# Patient Record
Sex: MALE | Race: WHITE | NOT HISPANIC OR LATINO | Employment: OTHER | URBAN - METROPOLITAN AREA
[De-identification: names, ages, dates, MRNs, and addresses within clinical notes are randomized per-mention and may not be internally consistent; named-entity substitution may affect disease eponyms.]

---

## 2021-01-05 ENCOUNTER — HOSPITAL ENCOUNTER (OUTPATIENT)
Dept: GASTROENTEROLOGY | Facility: HOSPITAL | Age: 66
Setting detail: HOSPITAL OUTPATIENT SURGERY
Discharge: HOME | End: 2021-01-05
Attending: INTERNAL MEDICINE

## 2021-05-22 ENCOUNTER — TRANSCRIBE ORDERS (OUTPATIENT)
Dept: LAB | Facility: HOSPITAL | Age: 66
End: 2021-05-22

## 2021-05-22 DIAGNOSIS — Z01.818 PRE-OP TESTING: Primary | ICD-10-CM

## 2021-08-02 ENCOUNTER — APPOINTMENT (OUTPATIENT)
Dept: LAB | Facility: HOSPITAL | Age: 66
End: 2021-08-02

## 2021-08-03 ENCOUNTER — TELEPHONE (OUTPATIENT)
Dept: GASTROENTEROLOGY | Facility: CLINIC | Age: 66
End: 2021-08-03

## 2021-08-03 NOTE — TELEPHONE ENCOUNTER
Pt called and stated they never recived their prep. Called in the prep to [facility] in [place] for the pt

## 2021-08-04 ENCOUNTER — LAB (OUTPATIENT)
Dept: LAB | Facility: HOSPITAL | Age: 66
End: 2021-08-04

## 2021-08-04 DIAGNOSIS — Z01.818 PRE-OP TESTING: ICD-10-CM

## 2021-08-04 PROCEDURE — U0003 INFECTIOUS AGENT DETECTION BY NUCLEIC ACID (DNA OR RNA); SEVERE ACUTE RESPIRATORY SYNDROME CORONAVIRUS 2 (SARS-COV-2) (CORONAVIRUS DISEASE [COVID-19]), AMPLIFIED PROBE TECHNIQUE, MAKING USE OF HIGH THROUGHPUT TECHNOLOGIES AS DESCRIBED BY CMS-2020-01-R: HCPCS

## 2021-08-04 PROCEDURE — C9803 HOPD COVID-19 SPEC COLLECT: HCPCS

## 2021-08-05 LAB — SARS-COV-2 RNA RESP QL NAA+PROBE: NOT DETECTED

## 2021-08-06 RX ORDER — PHENYTOIN SODIUM 100 MG/1
CAPSULE, EXTENDED RELEASE ORAL NIGHTLY
COMMUNITY

## 2021-08-06 RX ORDER — PHENOBARBITAL 16.2 MG/1
97.2 TABLET ORAL NIGHTLY
COMMUNITY

## 2021-08-09 ENCOUNTER — HOSPITAL ENCOUNTER (OUTPATIENT)
Facility: HOSPITAL | Age: 66
Setting detail: HOSPITAL OUTPATIENT SURGERY
Discharge: HOME | End: 2021-08-09
Attending: INTERNAL MEDICINE | Admitting: INTERNAL MEDICINE

## 2021-08-09 ENCOUNTER — ANESTHESIA EVENT (OUTPATIENT)
Dept: GASTROENTEROLOGY | Facility: HOSPITAL | Age: 66
End: 2021-08-09

## 2021-08-09 ENCOUNTER — ANESTHESIA (OUTPATIENT)
Dept: GASTROENTEROLOGY | Facility: HOSPITAL | Age: 66
End: 2021-08-09

## 2021-08-09 VITALS
DIASTOLIC BLOOD PRESSURE: 89 MMHG | WEIGHT: 204.15 LBS | RESPIRATION RATE: 23 BRPM | OXYGEN SATURATION: 97 % | SYSTOLIC BLOOD PRESSURE: 159 MMHG | BODY MASS INDEX: 27.06 KG/M2 | TEMPERATURE: 97.2 F | HEIGHT: 73 IN | HEART RATE: 51 BPM

## 2021-08-09 DIAGNOSIS — Z86.010 HISTORY OF COLON POLYPS: ICD-10-CM

## 2021-08-09 DIAGNOSIS — R13.10 DYSPHAGIA: ICD-10-CM

## 2021-08-09 PROCEDURE — 43249 ESOPH EGD DILATION <30 MM: CPT | Performed by: INTERNAL MEDICINE

## 2021-08-09 PROCEDURE — C1889 IMPLANT/INSERT DEVICE, NOC: HCPCS | Performed by: INTERNAL MEDICINE

## 2021-08-09 PROCEDURE — C1726 CATH, BAL DIL, NON-VASCULAR: HCPCS | Performed by: INTERNAL MEDICINE

## 2021-08-09 PROCEDURE — 25010000002 PROPOFOL 10 MG/ML EMULSION: Performed by: NURSE ANESTHETIST, CERTIFIED REGISTERED

## 2021-08-09 PROCEDURE — 88305 TISSUE EXAM BY PATHOLOGIST: CPT | Performed by: INTERNAL MEDICINE

## 2021-08-09 PROCEDURE — 43239 EGD BIOPSY SINGLE/MULTIPLE: CPT | Performed by: INTERNAL MEDICINE

## 2021-08-09 PROCEDURE — 45385 COLONOSCOPY W/LESION REMOVAL: CPT | Performed by: INTERNAL MEDICINE

## 2021-08-09 DEVICE — DEV CLIP ENDO RESOLUTION360 CONTRL ROT 235CM: Type: IMPLANTABLE DEVICE | Site: TRANSVERSE COLON | Status: FUNCTIONAL

## 2021-08-09 RX ORDER — OMEPRAZOLE 20 MG/1
40 CAPSULE, DELAYED RELEASE ORAL DAILY
Qty: 30 CAPSULE | Refills: 5 | Status: SHIPPED | OUTPATIENT
Start: 2021-08-09 | End: 2021-10-08

## 2021-08-09 RX ORDER — SODIUM CHLORIDE, SODIUM LACTATE, POTASSIUM CHLORIDE, CALCIUM CHLORIDE 600; 310; 30; 20 MG/100ML; MG/100ML; MG/100ML; MG/100ML
30 INJECTION, SOLUTION INTRAVENOUS CONTINUOUS
Status: DISCONTINUED | OUTPATIENT
Start: 2021-08-09 | End: 2021-08-09 | Stop reason: HOSPADM

## 2021-08-09 RX ORDER — LIDOCAINE HYDROCHLORIDE 20 MG/ML
INJECTION, SOLUTION INFILTRATION; PERINEURAL AS NEEDED
Status: DISCONTINUED | OUTPATIENT
Start: 2021-08-09 | End: 2021-08-09 | Stop reason: SURG

## 2021-08-09 RX ADMIN — LIDOCAINE HYDROCHLORIDE 40 MG: 20 INJECTION, SOLUTION INFILTRATION; PERINEURAL at 09:40

## 2021-08-09 RX ADMIN — LIDOCAINE HYDROCHLORIDE 40 MG: 20 INJECTION, SOLUTION INFILTRATION; PERINEURAL at 10:18

## 2021-08-09 RX ADMIN — PROPOFOL 250 MCG/KG/MIN: 10 INJECTION, EMULSION INTRAVENOUS at 09:40

## 2021-08-09 RX ADMIN — SODIUM CHLORIDE, POTASSIUM CHLORIDE, SODIUM LACTATE AND CALCIUM CHLORIDE 30 ML/HR: 600; 310; 30; 20 INJECTION, SOLUTION INTRAVENOUS at 08:57

## 2021-08-09 NOTE — ANESTHESIA PREPROCEDURE EVALUATION
Anesthesia Evaluation     Patient summary reviewed and Nursing notes reviewed   no history of anesthetic complications:  NPO Solid Status: > 8 hours  NPO Liquid Status: > 2 hours           Airway   Mallampati: II  TM distance: >3 FB  Neck ROM: full  No difficulty expected  Dental    (+) upper dentures    Pulmonary - normal exam    breath sounds clear to auscultation  (+) COPD,   Cardiovascular - negative cardio ROS and normal exam  Exercise tolerance: good (4-7 METS)    Rhythm: regular        Neuro/Psych  (+) seizures,     GI/Hepatic/Renal/Endo - negative ROS     Musculoskeletal (-) negative ROS    Abdominal    Substance History - negative use     OB/GYN negative ob/gyn ROS         Other - negative ROS                       Anesthesia Plan    ASA 2     general   total IV anesthesia(Patient understands anesthesia not responsible for dental damage.)  intravenous induction     Anesthetic plan, all risks, benefits, and alternatives have been provided, discussed and informed consent has been obtained with: patient.  Use of blood products discussed with patient .   Plan discussed with CRNA.

## 2021-08-09 NOTE — ANESTHESIA POSTPROCEDURE EVALUATION
Patient: Kam Reilly    Procedure Summary     Date: 08/09/21 Room / Location: [facility] ENDOSCOPY 2 / [facility] END[facility]COPY    Anesthesia Start: 0940 Anesthesia Stop: 1034    Procedures:       ESOPHAGOGASTRODUODENOSCOPY WITH BIOPSY, BALLOON DILATION 15 (N/A )      COLONOSCOPY WITH BIOPSY, POLYPECTOMY HOT SNARE, ENDO CLIPS X2 (N/A ) Diagnosis:     Surgeons: Bhargav Rueda MD Provider: Bonnie Wen MD    Anesthesia Type: general ASA Status: 2          Anesthesia Type: general    Vitals  Vitals Value Taken Time   /89 08/09/21 1057   Temp 36.2 °C (97.2 °F) 08/09/21 1057   Pulse 52 08/09/21 1059   Resp 23 08/09/21 1057   SpO2 97 % 08/09/21 1059   Vitals shown include unvalidated device data.        Post Anesthesia Care and Evaluation    Patient location during evaluation: bedside  Patient participation: complete - patient participated  Level of consciousness: awake  Pain score: 0  Pain management: adequate  Airway patency: patent  Anesthetic complications: No anesthetic complications  PONV Status: none  Cardiovascular status: acceptable and stable  Respiratory status: acceptable and room air  Hydration status: acceptable    Comments: An Anesthesiologist personally participated in the most demanding procedures (including induction and emergence if applicable) in the anesthesia plan, monitored the course of anesthesia administration at frequent intervals and remained physically present and available for immediate diagnosis and treatment of emergencies.

## 2021-08-09 NOTE — H&P
"ScreeningPre Procedure History & Physical    Chief Complaint:   Screening     Subjective     HPI:   Screening   dysphagia    Past Medical History:   Past Medical History:   Diagnosis Date   • COPD (chronic obstructive pulmonary disease) (CMS/HCC)    • Emphysema of lung (CMS/HCC)    • Seizure disorder (CMS/HCC)        Past Surgical History:  Past Surgical History:   Procedure Laterality Date   • BRAIN SURGERY      skull reconstruction    • COLONOSCOPY  2021   • LEG SURGERY     • OTHER SURGICAL HISTORY      arm surgery    • SHOULDER SURGERY         Family History:  Family History   Problem Relation Age of Onset   • Stomach cancer Maternal Grandfather         malignant        Social History:   reports that he has never smoked. His smokeless tobacco use includes chew. He reports that he does not drink alcohol.    Medications:   Medications Prior to Admission   Medication Sig Dispense Refill Last Dose   • PHENobarbital (LUMINAL) 16.2 MG tablet Take 97.2 mg by mouth Every Night.   8/8/2021 at Unknown time   • phenytoin (DILANTIN) 100 MG ER capsule Take  by mouth Every Night.   8/8/2021 at Unknown time       Allergies:  Patient has no known allergies.        Objective     Blood pressure 139/91, pulse 60, temperature 96.6 °F (35.9 °C), temperature source Temporal, resp. rate 18, height 185.4 cm (73\"), weight 92.6 kg (204 lb 2.3 oz), SpO2 97 %.    Physical Exam   Constitutional: Pt is oriented to person, place, and time and well-developed, well-nourished, and in no distress.   Mouth/Throat: Oropharynx is clear and moist.   Neck: Normal range of motion.   Cardiovascular: Normal rate, regular rhythm and normal heart sounds.    Pulmonary/Chest: Effort normal and breath sounds normal.   Abdominal: Soft. Nontender  Skin: Skin is warm and dry.   Psychiatric: Mood, memory, affect and judgment normal.     Assessment/Plan     Diagnosis:  Screening colonoscopy  H/o polyps  dysphagia     Anticipated Surgical " Procedure:  Colonoscopy  egd    The risks, benefits, and alternatives of this procedure have been discussed with the patient or the responsible party- the patient understands and agrees to proceed.

## 2021-08-10 LAB
CYTO UR: NORMAL
LAB AP CASE REPORT: NORMAL
LAB AP CLINICAL INFORMATION: NORMAL
PATH REPORT.FINAL DX SPEC: NORMAL
PATH REPORT.GROSS SPEC: NORMAL

## 2021-10-07 NOTE — PROGRESS NOTES
Chief Complaint   EGD and colonoscopy follow up    History of Present Illness       Kam Reilly is a 66 y.o. male who presents to [facility] for follow-up after recent EGD and colonoscopy.  We have reviewed EGD and colonoscopy as well as pathology.  He reports that his dysphagia has improved 60 to 70% since having dilation.  He reports good control of his reflux with Prilosec 40 mg daily.  Patient does suffer from allergies and takes Zyrtec daily.  Patient denies fever, nausea, vomiting, weight loss, night sweats, melena, hematochezia, hematemesis.    Endoscopy: Review of the patient's most recent EGD and colonoscopy performed by Dr. Rueda on 08/09/2021 revealed benign intrinsic stricture of the GE junction dilation up to 15 mm and a ringed esophagus consistent with eosinophilic esophagitis.  Small hiatal hernia stomach and duodenum normal.  8 polyps removed throughout the colon ranging from 8 to 12 mm in size.  With recommended follow-up repeat colonoscopy in 1 year.  Patient to continue on Prilosec 40 mg daily.      Results       Result Review :           CBC    CBC 3/17/21 9/14/21   WBC 5.06 4.39 (A)   RBC 5.11 4.55   Hemoglobin 14.8 13.3 (A)   Hematocrit 45.3 40.3 (A)   MCV 88.6 88.6   MCH 29.0 29.2   MCHC 32.7 33.0   RDW 12.8 13.0   Platelets 230 202   (A) Abnormal value                      Past Medical History       Past Medical History:   Diagnosis Date   • COPD (chronic obstructive pulmonary disease) (HCC)    • Emphysema of lung (HCC)    • Seizure disorder (HCC)        Past Surgical History:   Procedure Laterality Date   • BRAIN SURGERY      skull reconstruction    • COLONOSCOPY  2021   • COLONOSCOPY N/A 8/9/2021    Procedure: COLONOSCOPY WITH BIOPSY, POLYPECTOMY HOT SNARE, ENDO CLIPS X2;  Surgeon: Bhargav Rueda MD;  Location: [facility] ENDOSCOPY;  Service: Gastroenterology;  Laterality: N/A;  COLON POLYPS   • ENDOSCOPY N/A 8/9/2021    Procedure:  "ESOPHAGOGASTRODUODENOSCOPY WITH BIOPSY, BALLOON DILATION 15;  Surgeon: Bhargav Rueda MD;  Location: [facility] ENDOSCOPY;  Service: Gastroenterology;  Laterality: N/A;  HIATAL HERNIA, EOSINOPHILIC ESOPHAGITIS, ESOPHAGEAL STRICTURED   • LEG SURGERY     • OTHER SURGICAL HISTORY      arm surgery    • SHOULDER SURGERY     • UPPER GASTROINTESTINAL ENDOSCOPY           Current Outpatient Medications:   •  albuterol sulfate  (90 Base) MCG/ACT inhaler, INHALE 2 PUFFS BY MOUTH EVERY 4 HOURS AS NEEDED FOR WHEEZING FOR SHORTNESS OF BREATH, Disp: , Rfl:   •  fluticasone-salmeterol (ADVAIR) 250-50 MCG/DOSE DISKUS, Inhale 1 puff., Disp: , Rfl:   •  omeprazole (priLOSEC) 40 MG capsule, Take 1 capsule by mouth Daily., Disp: 30 capsule, Rfl: 6  •  PHENobarbital (LUMINAL) 16.2 MG tablet, Take 97.2 mg by mouth Every Night., Disp: , Rfl:   •  phenytoin (DILANTIN) 100 MG ER capsule, Take  by mouth Every Night., Disp: , Rfl:   •  fluticasone (Flonase) 50 MCG/ACT nasal spray, 2 sprays into the nostril(s) as directed by provider Daily., Disp: 18.2 mL, Rfl: 3     No Known Allergies    Family History   Problem Relation Age of Onset   • Stomach cancer Maternal Grandfather         malignant         Social History     Social History Narrative   • Not on file       Objective       Vital Signs:   /73 (BP Location: Left arm, Patient Position: Sitting, Cuff Size: Adult)   Pulse 64   Ht 185.4 cm (73\")   Wt 92.8 kg (204 lb 9.6 oz)   SpO2 98%   BMI 26.99 kg/m²       Physical Exam  Constitutional:       General: He is not in acute distress.     Appearance: Normal appearance.   HENT:      Head: Normocephalic.   Eyes:      Conjunctiva/sclera: Conjunctivae normal.      Pupils: Pupils are equal, round, and reactive to light.      Visual Fields: Right eye visual fields normal and left eye visual fields normal.   Neck:      Trachea: Trachea normal.   Cardiovascular:      Rate and Rhythm: Normal rate and regular rhythm.      Heart " sounds: Normal heart sounds.   Pulmonary:      Effort: Pulmonary effort is normal.      Breath sounds: Normal breath sounds and air entry.      Comments: Inspection of chest: normal appearance  Abdominal:      General: Abdomen is flat. Bowel sounds are normal.      Palpations: Abdomen is soft. There is no mass.      Tenderness: There is no guarding.   Musculoskeletal:      Right lower leg: No edema.      Left lower leg: No edema.   Skin:     Findings: No lesion.      Comments: Turgor is normal   Neurological:      Mental Status: He is alert and oriented to person, place, and time.   Psychiatric:         Mood and Affect: Mood and affect normal.           Assessment & Plan          Assessment and Plan    Diagnoses and all orders for this visit:    1. Gastroesophageal reflux disease with esophagitis without hemorrhage (Primary)    2. Esophagitis, eosinophilic    3. Personal history of colonic polyps    4. Hiatal hernia    5. Anemia, unspecified type    6. Oropharyngeal dysphagia    Other orders  -     fluticasone (Flonase) 50 MCG/ACT nasal spray; 2 sprays into the nostril(s) as directed by provider Daily.  Dispense: 18.2 mL; Refill: 3  -     omeprazole (priLOSEC) 40 MG capsule; Take 1 capsule by mouth Daily.  Dispense: 30 capsule; Refill: 6    66-year-old male presenting the office for a follow-up with a history of reflux, eosinophilic esophagitis, personal history of colon polyps, hiatal hernia, anemia and dysphagia.  We have reviewed EGD and colonoscopy as well as pathology at this time.  Patient will be due for repeat colonoscopy and EGD in 1 year.  Patient will continue Prilosec 40 mg daily.  I have added Flonase to his regimen in hopes to help with his eosinophilic esophagitis.  I have instructed the patient how to use this medication.  Handout was provided about eosinophilic esophagitis.  Patient will follow up in the office in 6 months.  Patient is agreeable to this plan will call with any questions or  concerns.            Follow Up       Follow Up   Return in about 6 months (around 4/8/2022).  Patient was given instructions and counseling regarding his condition or for health maintenance advice. Please see specific information pulled into the AVS if appropriate.

## 2021-10-08 ENCOUNTER — OFFICE VISIT (OUTPATIENT)
Dept: GASTROENTEROLOGY | Facility: CLINIC | Age: 66
End: 2021-10-08

## 2021-10-08 VITALS
WEIGHT: 204.6 LBS | HEIGHT: 73 IN | HEART RATE: 64 BPM | SYSTOLIC BLOOD PRESSURE: 145 MMHG | BODY MASS INDEX: 27.11 KG/M2 | OXYGEN SATURATION: 98 % | DIASTOLIC BLOOD PRESSURE: 73 MMHG

## 2021-10-08 DIAGNOSIS — Z86.010 PERSONAL HISTORY OF COLONIC POLYPS: ICD-10-CM

## 2021-10-08 DIAGNOSIS — K21.00 GASTROESOPHAGEAL REFLUX DISEASE WITH ESOPHAGITIS WITHOUT HEMORRHAGE: Primary | ICD-10-CM

## 2021-10-08 DIAGNOSIS — R13.12 OROPHARYNGEAL DYSPHAGIA: ICD-10-CM

## 2021-10-08 DIAGNOSIS — D64.9 ANEMIA, UNSPECIFIED TYPE: ICD-10-CM

## 2021-10-08 DIAGNOSIS — K44.9 HIATAL HERNIA: ICD-10-CM

## 2021-10-08 DIAGNOSIS — K20.0 ESOPHAGITIS, EOSINOPHILIC: ICD-10-CM

## 2021-10-08 PROCEDURE — 99213 OFFICE O/P EST LOW 20 MIN: CPT | Performed by: NURSE PRACTITIONER

## 2021-10-08 RX ORDER — FLUTICASONE PROPIONATE 50 MCG
2 SPRAY, SUSPENSION (ML) NASAL DAILY
Qty: 18.2 ML | Refills: 3 | Status: SHIPPED | OUTPATIENT
Start: 2021-10-08 | End: 2022-09-16

## 2021-10-08 RX ORDER — OMEPRAZOLE 40 MG/1
40 CAPSULE, DELAYED RELEASE ORAL DAILY
Qty: 30 CAPSULE | Refills: 6 | Status: SHIPPED | OUTPATIENT
Start: 2021-10-08

## 2021-10-08 RX ORDER — ALBUTEROL SULFATE 90 UG/1
AEROSOL, METERED RESPIRATORY (INHALATION)
COMMUNITY
Start: 2021-09-24

## 2021-10-08 NOTE — PATIENT INSTRUCTIONS
"Dominick's Neurology in Clinical Practice (8th ed., pp. 152-163). [place]: Elsevier.\"> Carrie Textbook of Surgery (21st ed., pp. 7756-2694). WILFREDO Buenrostro: [employer].\">   Dysphagia    Dysphagia is trouble swallowing. This condition occurs when solids and liquids stick in a person's throat on the way down to the stomach, or when food takes longer to get to the stomach than usual.  You may have problems swallowing food, liquids, or both. You may also have pain while trying to swallow. It may take you more time and effort to swallow something.  What are the causes?  This condition may be caused by:  · Muscle problems. They may make it difficult for you to move food and liquids through the esophagus, which is the tube that connects your mouth to your stomach.  · Blockages. You may have ulcers, scar tissue, or inflammation that blocks the normal passage of food and liquids. Causes of these problems include:  ? Acid reflux from your stomach into your esophagus (gastroesophageal reflux).  ? Infections.  ? Radiation treatment for cancer.  ? Medicines taken without enough fluids to wash them down into your stomach.  · Stroke. This can affect the nerves and make it difficult to swallow.  · Nerve problems. These prevent signals from being sent to the muscles of your esophagus to squeeze (contract) and move what you swallow down to your stomach.  · Globus pharyngeus. This is a common problem that involves a feeling like something is stuck in your throat or a sense of trouble with swallowing, even though nothing is wrong with the swallowing passages.  · Certain conditions, such as cerebral palsy or Parkinson's disease.  What are the signs or symptoms?  Common symptoms of this condition include:  · A feeling that solids or liquids are stuck in your throat on the way down to the stomach.  · Pain while swallowing.  · Coughing or gagging while trying to swallow.  Other symptoms include:  · Food moving back from your " stomach to your mouth (regurgitation).  · Noises coming from your throat.  · Chest discomfort with swallowing.  · A feeling of fullness when swallowing.  · Drooling, especially when the throat is blocked.  · Heartburn.  How is this diagnosed?  This condition may be diagnosed by:  · Barium X-ray. In this test, you will swallow a white liquid that sticks to the inside of your esophagus. X-ray images are then taken.  · Endoscopy. In this test, a flexible telescope is inserted down your throat to look at your esophagus and your stomach.  · CT scans and an MRI.  How is this treated?  Treatment for dysphagia depends on the cause of this condition, such as:  · If the dysphagia is caused by acid reflux or infection, medicines may be used. They may include antibiotics and heartburn medicines.  · If the dysphagia is caused by problems with the muscles, swallowing therapy may be used to help you strengthen your swallowing muscles. You may have to do specific exercises to strengthen the muscles or stretch them.  · If the dysphagia is caused by a blockage or mass, procedures to remove the blockage may be done. You may need surgery and a feeding tube.  You may need to make diet changes. Ask your health care provider for specific instructions.  Follow these instructions at home:  Medicines  · Take over-the-counter and prescription medicines only as told by your health care provider.  · If you were prescribed an antibiotic medicine, take it as told by your health care provider. Do not stop taking the antibiotic even if you start to feel better.  Eating and drinking    · Follow any diet changes as told by your health care provider.  · Work with a diet and nutrition specialist (dietitian) to create an eating plan that will help you get the nutrients you need in order to stay healthy.  · Eat soft foods that are easier to swallow.  · Cut your food into small pieces and eat slowly. Take small bites.  · Eat and drink only when you are  sitting upright.  · Do not drink alcohol or caffeine. If you need help quitting, ask your health care provider.    General instructions  · Check your weight every day to make sure you are not losing weight.  · Do not use any products that contain nicotine or tobacco, such as cigarettes, e-cigarettes, and chewing tobacco. If you need help quitting, ask your health care provider.  · Keep all follow-up visits as told by your health care provider. This is important.  Contact a health care provider if you:  · Lose weight because you cannot swallow.  · Cough when you drink liquids.  · Cough up partially digested food.  Get help right away if you:  · Cannot swallow your saliva.  · Have shortness of breath, a fever, or both.  · Have a hoarse voice and also have trouble swallowing.  Summary  · Dysphagia is trouble swallowing. This condition occurs when solids and liquids stick in a person's throat on the way down to the stomach. You may cough or gag while trying to swallow.  · Dysphagia has many possible causes.  · Treatment for dysphagia depends on the cause of the condition.  · Keep all follow-up visits as told by your health care provider. This is important.  This information is not intended to replace advice given to you by your health care provider. Make sure you discuss any questions you have with your health care provider.  Document Revised: 05/13/2020 Document Reviewed: 05/13/2020  [employer] Patient Education © 2021 [employer] Inc.

## 2022-04-06 NOTE — PROGRESS NOTES
Chief Complaint   Eosinophilic esophagitis    History of Present Illness       Kam Reilly is a 66 y.o. male who presents to [facility] for follow-up with a history of reflux, dysphagia, eosinophilic esophagitis, hiatal hernia, and colon polyps.  At the last office visit Flonase was added to the patient's current regimen to help with eosinophilic esophagitis.  Today the patient reports that he is doing really well he denies any reflux, dysphagia or symptoms of EOE.  Patient reports his dysphagia has resolved completely since having esophageal dilation and continuing on Prilosec 40 mg daily and Flonase.  Patient denies fever, nausea, vomiting, weight loss, night sweats, melena, hematochezia, hematemesis.    Endoscopy: Review of the patient's most recent EGD and colonoscopy performed by Dr. Rueda on 08/09/2021 revealed benign intrinsic stricture of the GE junction dilation up to 15 mm and a ringed esophagus consistent with eosinophilic esophagitis.  Small hiatal hernia stomach and duodenum normal.  8 polyps removed throughout the colon ranging from 8 to 12 mm in size.  With recommended follow-up repeat colonoscopy in 1 year.  Patient to continue on Prilosec 40 mg daily.    Results       Result Review :           CBC    CBC 9/14/21 12/8/21 3/15/22   WBC 4.39 (A) 5.38 5.35   RBC 4.55 4.74 4.93   Hemoglobin 13.3 (A) 14.0 14.2   Hematocrit 40.3 (A) 42.1 43.2   MCV 88.6 88.8 87.6   MCH 29.2 29.5 28.8   MCHC 33.0 33.3 32.9   RDW 13.0 12.3 12.8   Platelets 202 223 204   (A) Abnormal value                          Past Medical History       Past Medical History:   Diagnosis Date   • COPD (chronic obstructive pulmonary disease) (HCC)    • Emphysema of lung (HCC)    • Seizure disorder (HCC)        Past Surgical History:   Procedure Laterality Date   • BRAIN SURGERY      skull reconstruction    • COLONOSCOPY  2021   • COLONOSCOPY N/A 8/9/2021    Procedure: COLONOSCOPY WITH BIOPSY,  "POLYPECTOMY HOT SNARE, ENDO CLIPS X2;  Surgeon: Bhargav Rueda MD;  Location: [facility] ENDOSCOPY;  Service: Gastroenterology;  Laterality: N/A;  COLON POLYPS   • ENDOSCOPY N/A 8/9/2021    Procedure: ESOPHAGOGASTRODUODENOSCOPY WITH BIOPSY, BALLOON DILATION 15;  Surgeon: Bhargav Rueda MD;  Location: [facility] ENDOSCOPY;  Service: Gastroenterology;  Laterality: N/A;  HIATAL HERNIA, EOSINOPHILIC ESOPHAGITIS, ESOPHAGEAL STRICTURED   • LEG SURGERY     • OTHER SURGICAL HISTORY      arm surgery    • SHOULDER SURGERY     • UPPER GASTROINTESTINAL ENDOSCOPY           Current Outpatient Medications:   •  albuterol sulfate  (90 Base) MCG/ACT inhaler, INHALE 2 PUFFS BY MOUTH EVERY 4 HOURS AS NEEDED FOR WHEEZING FOR SHORTNESS OF BREATH, Disp: , Rfl:   •  fluticasone (Flonase) 50 MCG/ACT nasal spray, 2 sprays into the nostril(s) as directed by provider Daily., Disp: 18.2 mL, Rfl: 3  •  fluticasone-salmeterol (ADVAIR) 250-50 MCG/DOSE DISKUS, Inhale 1 puff., Disp: , Rfl:   •  omeprazole (priLOSEC) 40 MG capsule, Take 1 capsule by mouth Daily., Disp: 30 capsule, Rfl: 6  •  PHENobarbital (LUMINAL) 16.2 MG tablet, Take 97.2 mg by mouth Every Night., Disp: , Rfl:   •  phenytoin (DILANTIN) 100 MG ER capsule, Take  by mouth Every Night., Disp: , Rfl:      No Known Allergies    Family History   Problem Relation Age of Onset   • Stomach cancer Maternal Grandfather         malignant    • Colon cancer Neg Hx         Social History     Social History Narrative   • Not on file       Objective     Vital Signs:   /81 (BP Location: Right arm, Patient Position: Sitting, Cuff Size: Adult)   Pulse 62   Ht 185.4 cm (73\")   Wt 97.3 kg (214 lb 6.4 oz)   SpO2 98%   BMI 28.29 kg/m²       Physical Exam  Constitutional:       General: He is not in acute distress.     Appearance: Normal appearance. He is well-developed and normal weight.   Eyes:      Conjunctiva/sclera: Conjunctivae normal.      Pupils: Pupils are equal, round, " and reactive to light.      Visual Fields: Right eye visual fields normal and left eye visual fields normal.   Cardiovascular:      Rate and Rhythm: Normal rate and regular rhythm.      Heart sounds: Normal heart sounds.   Pulmonary:      Effort: Pulmonary effort is normal. No retractions.      Breath sounds: Normal breath sounds and air entry.      Comments: Inspection of chest: normal appearance  Abdominal:      General: Bowel sounds are normal.      Palpations: Abdomen is soft.      Tenderness: There is no abdominal tenderness.      Comments: No appreciable hepatosplenomegaly   Musculoskeletal:      Cervical back: Neck supple.      Right lower leg: No edema.      Left lower leg: No edema.   Lymphadenopathy:      Cervical: No cervical adenopathy.   Skin:     Findings: No lesion.      Comments: Turgor normal   Neurological:      Mental Status: He is alert and oriented to person, place, and time.   Psychiatric:         Mood and Affect: Mood and affect normal.           Assessment & Plan          Assessment and Plan    Diagnoses and all orders for this visit:    1. Gastroesophageal reflux disease with esophagitis without hemorrhage (Primary)    2. Esophagitis, eosinophilic    3. Personal history of colonic polyps    4. Hiatal hernia    5. Oropharyngeal dysphagia    6. History of colon polyps      66-year-old male presenting the office today  for follow-up with a history of reflux, dysphagia, eosinophilic esophagitis, hiatal hernia, and colon polyps.  Patient will continue on Prilosec and Flonase as this is providing relief for his reflux and acidophilic esophagitis.  Patient will be due for repeat EGD and colonoscopy in August he would like to wait to schedule until that time.  We will follow up in the office after endoscopy.  Patient agreeable to this plan will call with any questions or concerns.            Follow Up       Follow Up   Return if symptoms worsen or fail to improve, for Patient will be due for  colonoscopy and EGD in August patient in the recall system..  Patient was given instructions and counseling regarding his condition or for health maintenance advice. Please see specific information pulled into the AVS if appropriate.

## 2022-04-07 ENCOUNTER — OFFICE VISIT (OUTPATIENT)
Dept: GASTROENTEROLOGY | Facility: CLINIC | Age: 67
End: 2022-04-07

## 2022-04-07 VITALS
DIASTOLIC BLOOD PRESSURE: 81 MMHG | WEIGHT: 214.4 LBS | BODY MASS INDEX: 28.41 KG/M2 | HEIGHT: 73 IN | OXYGEN SATURATION: 98 % | HEART RATE: 62 BPM | SYSTOLIC BLOOD PRESSURE: 154 MMHG

## 2022-04-07 DIAGNOSIS — K21.00 GASTROESOPHAGEAL REFLUX DISEASE WITH ESOPHAGITIS WITHOUT HEMORRHAGE: Primary | ICD-10-CM

## 2022-04-07 DIAGNOSIS — R13.12 OROPHARYNGEAL DYSPHAGIA: ICD-10-CM

## 2022-04-07 DIAGNOSIS — Z86.010 PERSONAL HISTORY OF COLONIC POLYPS: ICD-10-CM

## 2022-04-07 DIAGNOSIS — K44.9 HIATAL HERNIA: ICD-10-CM

## 2022-04-07 DIAGNOSIS — K20.0 ESOPHAGITIS, EOSINOPHILIC: ICD-10-CM

## 2022-04-07 DIAGNOSIS — Z86.010 HISTORY OF COLON POLYPS: ICD-10-CM

## 2022-04-07 PROCEDURE — 99213 OFFICE O/P EST LOW 20 MIN: CPT | Performed by: NURSE PRACTITIONER

## 2022-04-07 NOTE — PATIENT INSTRUCTIONS
"Dominick's Neurology in Clinical Practice (8th ed., pp. 152-163). Chitra PA: Elsevier.\"> Carrie Textbook of Surgery (21st ed., pp. 2943-3355). Chitra PA: Elsevier[employer] Inc.\">   Dysphagia    Dysphagia is trouble swallowing. This condition occurs when solids and liquids stick in a person's throat on the way down to the stomach, or when food takes longer to get to the stomach than usual.  You may have problems swallowing food, liquids, or both. You may also have pain while trying to swallow. It may take you more time and effort to swallow something.  What are the causes?  This condition may be caused by:  Muscle problems. These may make it difficult for you to move food and liquids through the esophagus, which is the tube that connects your mouth to your stomach.  Blockages. You may have ulcers, scar tissue, or inflammation that blocks the normal passage of food and liquids. Causes of these problems include:  Acid reflux from your stomach into your esophagus (gastroesophageal reflux).  Infections.  Radiation treatment for cancer.  Medicines taken without enough fluids to wash them down into your stomach.  Stroke. This can affect the nerves and make it difficult to swallow.  Nerve problems. These prevent signals from being sent to the muscles of your esophagus to squeeze (contract) and move what you swallow down to your stomach.  Globus pharyngeus. This is a common problem that involves a feeling like something is stuck in your throat or a sense of trouble with swallowing, even though nothing is wrong with the swallowing passages.  Certain conditions, such as cerebral palsy or Parkinson's disease.  What are the signs or symptoms?  Common symptoms of this condition include:  A feeling that solids or liquids are stuck in your throat on the way down to the stomach.  Pain while swallowing.  Coughing or gagging while trying to swallow.  Other symptoms include:  Food moving back from your stomach to your mouth " (regurgitation).  Noises coming from your throat.  Chest discomfort when swallowing.  A feeling of fullness when swallowing.  Drooling, especially when the throat is blocked.  Heartburn.  How is this diagnosed?  This condition may be diagnosed by:  Barium swallow X-ray. In this test, you will swallow a white liquid that sticks to the inside of your esophagus. X-ray images are then taken.  Endoscopy. In this test, a flexible telescope is inserted down your throat to look at your esophagus and your stomach.  CT scans or an MRI.  How is this treated?  Treatment for dysphagia depends on the cause of this condition:  If the dysphagia is caused by acid reflux or infection, medicines may be used. These may include antibiotics or heartburn medicines.  If the dysphagia is caused by problems with the muscles, swallowing therapy may be used to help you strengthen your swallowing muscles. You may have to do specific exercises to strengthen the muscles or stretch them.  If the dysphagia is caused by a blockage or mass, procedures to remove the blockage may be done. You may need surgery and a feeding tube.  You may need to make diet changes. Ask your health care provider for specific instructions.  Follow these instructions at home:  Medicines  Take over-the-counter and prescription medicines only as told by your health care provider.  If you were prescribed an antibiotic medicine, take it as told by your health care provider. Do not stop taking the antibiotic even if you start to feel better.  Eating and drinking    Make any diet changes as told by your health care provider.  Work with a diet and nutrition specialist (dietitian) to create an eating plan that will help you get the nutrients you need in order to stay healthy.  Eat soft foods that are easier to swallow.  Cut your food into small pieces and eat slowly. Take small bites.  Eat and drink only when you are sitting upright.  Do not drink alcohol or caffeine. If you need  help quitting, ask your health care provider.    General instructions  Check your weight every day to make sure you are not losing weight.  Do not use any products that contain nicotine or tobacco. These products include cigarettes, chewing tobacco, and vaping devices, such as e-cigarettes. If you need help quitting, ask your health care provider.  Keep all follow-up visits. This is important.  Contact a health care provider if:  You lose weight because you cannot swallow.  You cough when you drink liquids.  You cough up partially digested food.  Get help right away if:  You cannot swallow your saliva.  You have shortness of breath, a fever, or both.  Your voice is hoarse and you have trouble swallowing.  These symptoms may represent a serious problem that is an emergency. Do not wait to see if the symptoms will go away. Get medical help right away. Call your local emergency services (911 in the [place] Do not drive yourself to the hospital.  Summary  Dysphagia is trouble swallowing. This condition occurs when solids and liquids stick in a person's throat on the way down to the stomach. You may cough or gag while trying to swallow.  Dysphagia has many possible causes.  Treatment for dysphagia depends on the cause of the condition.  Keep all follow-up visits. This is important.  This information is not intended to replace advice given to you by your health care provider. Make sure you discuss any questions you have with your health care provider.  Document Revised: 08/07/2021 Document Reviewed: 08/07/2021  Elsevier Patient Education © 2021 Elsevier Inc.  Food Choices for Gastroesophageal Reflux Disease, Adult  When you have gastroesophageal reflux disease (GERD), the foods you eat and your eating habits are very important. Choosing the right foods can help ease your discomfort. Think about working with a food expert (dietitian) to help you make good choices.  What are tips for following this plan?  Reading food  labels  Look for foods that are low in saturated fat. Foods that may help with your symptoms include:  Foods that have less than 5% of daily value (DV) of fat.  Foods that have 0 grams of trans fat.  Cooking  Do not rodriguez your food.  Cook your food by baking, steaming, grilling, or broiling. These are all methods that do not need a lot of fat for cooking.  To add flavor, try to use herbs that are low in spice and acidity.  Meal planning    Choose healthy foods that are low in fat, such as:  Fruits and vegetables.  Whole grains.  Low-fat dairy products.  Lean meats, fish, and poultry.  Eat small meals often instead of eating 3 large meals each day. Eat your meals slowly in a place where you are relaxed. Avoid bending over or lying down until 2-3 hours after eating.  Limit high-fat foods such as fatty meats or fried foods.  Limit your intake of fatty foods, such as oils, butter, and shortening.  Avoid the following as told by your doctor:  Foods that cause symptoms. These may be different for different people. Keep a food diary to keep track of foods that cause symptoms.  Alcohol.  Drinking a lot of liquid with meals.  Eating meals during the 2-3 hours before bed.    Lifestyle  Stay at a healthy weight. Ask your doctor what weight is healthy for you. If you need to lose weight, work with your doctor to do so safely.  Exercise for at least 30 minutes on 5 or more days each week, or as told by your doctor.  Wear loose-fitting clothes.  Do not smoke or use any products that contain nicotine or tobacco. If you need help quitting, ask your doctor.  Sleep with the head of your bed higher than your feet. Use a wedge under the mattress or blocks under the bed frame to raise the head of the bed.  Chew sugar-free gum after meals.  What foods should eat?    Eat a healthy, well-balanced diet of fruits, vegetables, whole grains, low-fat dairy products, lean meats, fish, and poultry. Each person is different.  Foods that may cause  symptoms in one person may not cause any symptoms in another person. Work with your doctor to find foods that are safe for you.  The items listed above may not be a complete list of what you can eat and drink. Contact a food expert for more options.  What foods should I avoid?  Limiting some of these foods may help in managing the symptoms of GERD. Everyone is different. Talk with a food expert or your doctor to help you find the exact foods to avoid, if any.  Fruits  Any fruits prepared with added fat. Any fruits that cause symptoms. For some people, this may include citrus fruits, such as oranges, grapefruit, pineapple, and aric.  Vegetables  Deep-fried vegetables. French fries. Any vegetables prepared with added fat. Any vegetables that cause symptoms. For some people, this may include tomatoes and tomato products, chili peppers, onions and garlic, and horseradish.  Grains  Pastries or quick breads with added fat.  Meats and other proteins  High-fat meats, such as fatty beef or pork, hot dogs, ribs, ham, sausage, salami, and lozoya. Fried meat or protein, including fried fish and fried chicken. Nuts and nut butters, in large amounts.  Dairy  Whole milk and chocolate milk. Sour cream. Cream. Ice cream. Cream cheese. Milkshakes.  Fats and oils  Butter. Margarine. Shortening. Ghee.  Beverages  Coffee and tea, with or without caffeine. Carbonated beverages. Sodas. Energy drinks. Fruit juice made with acidic fruits, such as orange or grapefruit. Tomato juice. Alcoholic drinks.  Sweets and desserts  Chocolate and cocoa. Donuts.  Seasonings and condiments  Pepper. Peppermint and spearmint. Added salt. Any condiments, herbs, or seasonings that cause symptoms. For some people, this may include vasquez, hot sauce, or vinegar-based salad dressings.  The items listed above may not be a complete list of what you should not eat and drink. Contact a food expert for more options.  Questions to ask your doctor  Diet and lifestyle  changes are often the first steps that are taken to manage symptoms of GERD. If diet and lifestyle changes do not help, talk with your doctor about taking medicines.  Where to find more information  International Foundation for Gastrointestinal Disorders: aboutgerd.org  Summary  When you have GERD, food and lifestyle choices are very important in easing your symptoms.  Eat small meals often instead of 3 large meals a day. Eat your meals slowly and in a place where you are relaxed.  Avoid bending over or lying down until 2-3 hours after eating.  Limit high-fat foods such as fatty meats or fried foods.  This information is not intended to replace advice given to you by your health care provider. Make sure you discuss any questions you have with your health care provider.  Document Revised: 06/28/2021 Document Reviewed: 06/28/2021  Elsevier Patient Education © 2021 Elsevier Inc.

## 2022-09-16 RX ORDER — FLUTICASONE PROPIONATE 50 MCG
SPRAY, SUSPENSION (ML) NASAL
Qty: 16 G | Refills: 0 | Status: SHIPPED | OUTPATIENT
Start: 2022-09-16

## 2024-08-22 ENCOUNTER — CLINICAL SUPPORT (OUTPATIENT)
Dept: GASTROENTEROLOGY | Facility: CLINIC | Age: 69
End: 2024-08-22
Payer: MEDICARE

## 2024-08-22 ENCOUNTER — PREP FOR SURGERY (OUTPATIENT)
Dept: OTHER | Facility: HOSPITAL | Age: 69
End: 2024-08-22
Payer: MEDICARE

## 2024-08-22 DIAGNOSIS — Z86.010 HISTORY OF COLON POLYPS: ICD-10-CM

## 2024-08-22 DIAGNOSIS — Z12.11 COLON CANCER SCREENING: Primary | ICD-10-CM

## 2024-08-22 PROBLEM — Z86.0100 HISTORY OF COLON POLYPS: Status: ACTIVE | Noted: 2024-08-22

## 2024-08-22 RX ORDER — SODIUM PICOSULFATE, MAGNESIUM OXIDE, AND ANHYDROUS CITRIC ACID 12; 3.5; 1 G/175ML; G/175ML; MG/175ML
175 LIQUID ORAL TAKE AS DIRECTED
Qty: 350 ML | Refills: 0 | Status: SHIPPED | OUTPATIENT
Start: 2024-08-22

## 2024-08-22 NOTE — PROGRESS NOTES
Kam Reilly  1955  69 y.o.    Reason for call: 1 year recall- DUE 2022, LAST COLON 8/2021 KM, HX OF COLON POLYPS  Prep prescribed: Clenpiq  Prep instructions reviewed with patient and sent to patient via regular mail to the home address on file  Is the patient currently on any injectable or oral medications for weight loss or diabetes? No  Clearance needed? No  If yes, what clearance is needed? N/A  Clearance has been requested from N/A  The patient has been scheduled for: Colonoscopy  After your procedure, you will be contacted with results. Please confirm the best phone # to reach the patient: [phone]  Family history of colon cancer? No  If yes, indicate relative: N/A  Tentative Procedure Date: 10/30/2024    Family History   Problem Relation Age of Onset    Stomach cancer Maternal Grandfather         malignant     Colon cancer Neg Hx      Past Medical History:   Diagnosis Date    Colon polyp     COPD (chronic obstructive pulmonary disease)     Emphysema of lung     Seizure disorder      No Known Allergies  Past Surgical History:   Procedure Laterality Date    BRAIN SURGERY      skull reconstruction     COLONOSCOPY  2021    COLONOSCOPY N/A 8/9/2021    Procedure: COLONOSCOPY WITH BIOPSY, POLYPECTOMY HOT SNARE, ENDO CLIPS X2;  Surgeon: Bhargav Rueda MD;  Location: [facility] ENDOSCOPY;  Service: Gastroenterology;  Laterality: N/A;  COLON POLYPS    ENDOSCOPY N/A 8/9/2021    Procedure: ESOPHAGOGASTRODUODENOSCOPY WITH BIOPSY, BALLOON DILATION 15;  Surgeon: Bhargav Rueda MD;  Location: [facility] ENDOSCOPY;  Service: Gastroenterology;  Laterality: N/A;  HIATAL HERNIA, EOSINOPHILIC ESOPHAGITIS, ESOPHAGEAL STRICTURED    LEG SURGERY      OTHER SURGICAL HISTORY      arm surgery     SHOULDER SURGERY      UPPER GASTROINTESTINAL ENDOSCOPY       Social History     Socioeconomic History    Marital status:    Tobacco Use    Smoking status: Former     Passive exposure: Past    Smokeless tobacco:  Current     Types: Chew   Vaping Use    Vaping status: Never Used   Substance and Sexual Activity    Alcohol use: Not Currently     Comment: quit at age 41    Drug use: Never    Sexual activity: Defer       Current Outpatient Medications:     albuterol sulfate  (90 Base) MCG/ACT inhaler, INHALE 2 PUFFS BY MOUTH EVERY 4 HOURS AS NEEDED FOR WHEEZING FOR SHORTNESS OF BREATH, Disp: , Rfl:     fluticasone (FLONASE) 50 MCG/ACT nasal spray, Use 2 spray(s) in each nostril once daily, Disp: 16 g, Rfl: 0    fluticasone-salmeterol (ADVAIR) 250-50 MCG/DOSE DISKUS, Inhale 1 puff., Disp: , Rfl:     PHENobarbital 97.2 MG tablet, Take 1 tablet by mouth Every Night., Disp: , Rfl:     phenytoin (DILANTIN) 100 MG ER capsule, Take  by mouth Every Night., Disp: , Rfl:     azithromycin (Zithromax Z-Homer) 250 MG tablet, Take 2 tablets by mouth on day 1, then 1 tablet daily on days 2-5 (Patient not taking: Reported on 8/22/2024), Disp: 6 tablet, Rfl: 0    omeprazole (priLOSEC) 40 MG capsule, Take 1 capsule by mouth Daily. (Patient not taking: Reported on 8/22/2024), Disp: 30 capsule, Rfl: 6

## 2024-10-18 NOTE — PRE-PROCEDURE INSTRUCTIONS
Reminded of arrival time at 0600        , Entrance C of the [facility]. Instructed to bring or have a  over the age of 18 set up to drive you home the day of procedure.    Instructed on clear liquid diet the day before, nothing red or purple. Call with any questions about the prep or if in need of the prep.  Reminded them not to eat or drink anything am of procedure unless its a sip of water with medications. Bring inhalers.  Patient verbalized understanding.

## 2024-10-29 ENCOUNTER — ANESTHESIA EVENT (OUTPATIENT)
Dept: GASTROENTEROLOGY | Facility: HOSPITAL | Age: 69
End: 2024-10-29
Payer: COMMERCIAL

## 2024-10-29 NOTE — ANESTHESIA PREPROCEDURE EVALUATION
Anesthesia Evaluation     Nursing notes reviewed   no history of anesthetic complications:   NPO Solid Status: > 8 hours  NPO Liquid Status: > 2 hours           Airway   Mallampati: II  TM distance: >3 FB  Neck ROM: full  No difficulty expected  Dental    (+) upper dentures    Pulmonary - normal exam    breath sounds clear to auscultation  (+) COPD (inhalers daily, nebs prn) mild,  Cardiovascular - negative cardio ROS and normal exam  Exercise tolerance: good (4-7 METS)    Rhythm: regular        Neuro/Psych  (+) seizures (last seizure > 30 yrs ago - takes phenobarbital and dilantin - last dose was 10 /29 at ) well controlled  GI/Hepatic/Renal/Endo    (+) obesity    Musculoskeletal (-) negative ROS    Abdominal    Substance History - negative use     OB/GYN negative ob/gyn ROS         Other - negative ROS       ROS/Med Hx Other: Screening, hx polyps    No EKG       Phys Exam Other: Full beard               Anesthesia Plan    ASA 3     general   total IV anesthesia  (Patient understands anesthesia not responsible for dental damage.)  intravenous induction     Anesthetic plan, risks, benefits, and alternatives have been provided, discussed and informed consent has been obtained with: patient and spouse/significant other.  Pre-procedure education provided  Use of blood products discussed with patient .    Plan discussed with CRNA.

## 2024-10-30 ENCOUNTER — HOSPITAL ENCOUNTER (OUTPATIENT)
Facility: HOSPITAL | Age: 69
Setting detail: HOSPITAL OUTPATIENT SURGERY
Discharge: HOME | End: 2024-10-30
Attending: INTERNAL MEDICINE | Admitting: INTERNAL MEDICINE
Payer: COMMERCIAL

## 2024-10-30 ENCOUNTER — ANESTHESIA (OUTPATIENT)
Dept: GASTROENTEROLOGY | Facility: HOSPITAL | Age: 69
End: 2024-10-30
Payer: COMMERCIAL

## 2024-10-30 VITALS
HEART RATE: 62 BPM | TEMPERATURE: 97.9 F | DIASTOLIC BLOOD PRESSURE: 98 MMHG | WEIGHT: 220.46 LBS | OXYGEN SATURATION: 94 % | SYSTOLIC BLOOD PRESSURE: 163 MMHG | BODY MASS INDEX: 29.22 KG/M2 | RESPIRATION RATE: 18 BRPM | HEIGHT: 73 IN

## 2024-10-30 DIAGNOSIS — Z86.0100 HISTORY OF COLON POLYPS: ICD-10-CM

## 2024-10-30 DIAGNOSIS — Z12.11 COLON CANCER SCREENING: ICD-10-CM

## 2024-10-30 PROCEDURE — 45385 COLONOSCOPY W/LESION REMOVAL: CPT | Performed by: INTERNAL MEDICINE

## 2024-10-30 PROCEDURE — 25810000003 LACTATED RINGERS PER 1000 ML

## 2024-10-30 PROCEDURE — 25010000002 PROPOFOL 10 MG/ML EMULSION

## 2024-10-30 PROCEDURE — 88305 TISSUE EXAM BY PATHOLOGIST: CPT | Performed by: INTERNAL MEDICINE

## 2024-10-30 PROCEDURE — C1726 CATH, BAL DIL, NON-VASCULAR: HCPCS | Performed by: INTERNAL MEDICINE

## 2024-10-30 PROCEDURE — 25010000002 LIDOCAINE PF 2% 2 % SOLUTION

## 2024-10-30 PROCEDURE — 43239 EGD BIOPSY SINGLE/MULTIPLE: CPT | Performed by: INTERNAL MEDICINE

## 2024-10-30 PROCEDURE — 43249 ESOPH EGD DILATION <30 MM: CPT | Performed by: INTERNAL MEDICINE

## 2024-10-30 RX ORDER — LIDOCAINE HYDROCHLORIDE 20 MG/ML
INJECTION, SOLUTION EPIDURAL; INFILTRATION; INTRACAUDAL; PERINEURAL AS NEEDED
Status: DISCONTINUED | OUTPATIENT
Start: 2024-10-30 | End: 2024-10-30 | Stop reason: SURG

## 2024-10-30 RX ORDER — PROPOFOL 10 MG/ML
VIAL (ML) INTRAVENOUS AS NEEDED
Status: DISCONTINUED | OUTPATIENT
Start: 2024-10-30 | End: 2024-10-30 | Stop reason: SURG

## 2024-10-30 RX ORDER — SODIUM CHLORIDE, SODIUM LACTATE, POTASSIUM CHLORIDE, CALCIUM CHLORIDE 600; 310; 30; 20 MG/100ML; MG/100ML; MG/100ML; MG/100ML
30 INJECTION, SOLUTION INTRAVENOUS CONTINUOUS
Status: DISCONTINUED | OUTPATIENT
Start: 2024-10-30 | End: 2024-10-30 | Stop reason: HOSPADM

## 2024-10-30 RX ADMIN — PROPOFOL 80 MG: 10 INJECTION, EMULSION INTRAVENOUS at 07:45

## 2024-10-30 RX ADMIN — SODIUM CHLORIDE, POTASSIUM CHLORIDE, SODIUM LACTATE AND CALCIUM CHLORIDE 30 ML/HR: 600; 310; 30; 20 INJECTION, SOLUTION INTRAVENOUS at 06:53

## 2024-10-30 RX ADMIN — LIDOCAINE HYDROCHLORIDE 80 MG: 20 INJECTION, SOLUTION EPIDURAL; INFILTRATION; INTRACAUDAL; PERINEURAL at 07:45

## 2024-10-30 RX ADMIN — PROPOFOL 150 MCG/KG/MIN: 10 INJECTION, EMULSION INTRAVENOUS at 07:46

## 2024-10-30 NOTE — ANESTHESIA POSTPROCEDURE EVALUATION
Patient: Kam Reilly    Procedure Summary       Date: 10/30/24 Room / Location: [facility] ENDOSCOPY 2 / [facility] ENDOSCOPY    Anesthesia Start: 0742 Anesthesia Stop: 0829    Procedures:       COLONOSCOPY WITH HOT AND COLD SNARE POLYPECTOMIES      ESOPHAGOGASTRODUODENOSCOPY WITH BIOPSIES, BALLOON DILITATION TO 18 Diagnosis:       Colon cancer screening      History of colon polyps      (Colon cancer screening [Z12.11])      (History of colon polyps [Z86.010])    Surgeons: Bhargav Rueda MD Provider: Olga Lidia Alaniz CRNA    Anesthesia Type: general ASA Status: 3            Anesthesia Type: general    Vitals  Vitals Value Taken Time   /98 10/30/24 0844   Temp 36.6 °C (97.9 °F) 10/30/24 0844   Pulse 61 10/30/24 0845   Resp 18 10/30/24 0844   SpO2 95 % 10/30/24 0845   Vitals shown include unfiled device data.        Post Anesthesia Care and Evaluation    Post-procedure mental status: acceptable.  Pain management: satisfactory to patient    Airway patency: patent  Anesthetic complications: No anesthetic complications    Cardiovascular status: acceptable  Respiratory status: acceptable    Comments: Per chart review
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
## 2024-10-31 LAB
CYTO UR: NORMAL
LAB AP CASE REPORT: NORMAL
LAB AP CLINICAL INFORMATION: NORMAL
PATH REPORT.FINAL DX SPEC: NORMAL
PATH REPORT.GROSS SPEC: NORMAL

## (undated) DEVICE — SOL IRRG H2O PL/BG 1000ML STRL

## (undated) DEVICE — ESOPHAGEAL BALLOON DILATATION CATHETER: Brand: CRE FIXED WIRE

## (undated) DEVICE — PAD GRND E/S W/CORD SPLT A/

## (undated) DEVICE — Device: Brand: DEFENDO AIR/WATER/SUCTION AND BIOPSY VALVE

## (undated) DEVICE — BALN DIL ESOPH CRE CONTRL RADL 15/18MM 8CM BX5

## (undated) DEVICE — SNAR POLYP CAPTIFLEX XS/OVL 11X2.4MM 240CM 1P/U

## (undated) DEVICE — CONN JET HYDRA H20 AUXILIARY DISP

## (undated) DEVICE — PAD GRND REM POLYHESIVE A/ DISP

## (undated) DEVICE — SINGLE-USE BIOPSY FORCEPS: Brand: RADIAL JAW 4

## (undated) DEVICE — EGD OR ERCP KIT: Brand: MEDLINE INDUSTRIES, INC.

## (undated) DEVICE — SOLIDIFIER LIQLOC PLS 1500CC BT

## (undated) DEVICE — SNAR E/S POLYP SNAREMASTER OVL/10MM 2.8X2300MM YEL

## (undated) DEVICE — THE SINGLE USE ETRAP – POLYP TRAP IS USED FOR SUCTION RETRIEVAL OF ENDOSCOPICALLY REMOVED POLYPS.: Brand: ETRAP

## (undated) DEVICE — DEV INFL CRE STERIFLATE 60CC DISP

## (undated) DEVICE — LINER SURG CANSTR SXN S/RIGD 1500CC

## (undated) DEVICE — Device

## (undated) DEVICE — COLON KIT: Brand: MEDLINE INDUSTRIES, INC.